# Patient Record
Sex: MALE | Race: WHITE | NOT HISPANIC OR LATINO | Employment: FULL TIME | URBAN - METROPOLITAN AREA
[De-identification: names, ages, dates, MRNs, and addresses within clinical notes are randomized per-mention and may not be internally consistent; named-entity substitution may affect disease eponyms.]

---

## 2024-08-21 ENCOUNTER — HOSPITAL ENCOUNTER (OUTPATIENT)
Dept: RADIOLOGY | Facility: CLINIC | Age: 34
Discharge: HOME | End: 2024-08-21
Payer: COMMERCIAL

## 2024-08-21 DIAGNOSIS — M79.671 RIGHT FOOT PAIN: ICD-10-CM

## 2024-08-21 PROCEDURE — 73630 X-RAY EXAM OF FOOT: CPT | Mod: RT

## 2024-08-22 ENCOUNTER — OFFICE VISIT (OUTPATIENT)
Dept: ORTHOPEDIC SURGERY | Facility: CLINIC | Age: 34
End: 2024-08-22
Payer: COMMERCIAL

## 2024-08-22 VITALS — WEIGHT: 185 LBS | HEIGHT: 70 IN | BODY MASS INDEX: 26.48 KG/M2

## 2024-08-22 DIAGNOSIS — S93.601A RIGHT FOOT SPRAIN, INITIAL ENCOUNTER: Primary | ICD-10-CM

## 2024-08-22 PROCEDURE — 3008F BODY MASS INDEX DOCD: CPT

## 2024-08-22 PROCEDURE — 1036F TOBACCO NON-USER: CPT

## 2024-08-22 PROCEDURE — 99203 OFFICE O/P NEW LOW 30 MIN: CPT

## 2024-08-22 ASSESSMENT — PAIN SCALES - GENERAL: PAINLEVEL_OUTOF10: 4

## 2024-08-22 ASSESSMENT — PAIN - FUNCTIONAL ASSESSMENT: PAIN_FUNCTIONAL_ASSESSMENT: 0-10

## 2024-08-22 NOTE — PROGRESS NOTES
HPI  Mercy Gonzalez is a 34 y.o. male  in office today for   Chief Complaint   Patient presents with    Right Foot - Pain, Edema     Patient was weight lifting jumped on the weight and rolled his foot 8/20/24   .  he states that the pain the last 2 days has been 8/10, today 4/10, so it is improving.  Pain is medial side of foot at base of big toe, worse with eversion of foot.  He went to urgent care, placed into boot.  States it hurts to walk out of the boot.  He has been taking ibuprofen which does help with pain, icing and elevating.      Medication  No current outpatient medications on file prior to visit.     No current facility-administered medications on file prior to visit.       Physical Exam  Constitutional: well developed, well nourished male in no acute distress  Psychiatric: normal mood, appropriate affect  Eyes: sclera anicteric  HENT: normocephalic/atraumatic  CV: regular rate and rhythm   Respiratory: non labored breathing  Integumentary: no rash  Neurological: moves all extremities    Right Ankle Exam     Tenderness   Right ankle tenderness location: base of big toe near the MTP joint.  No tenderness base of first metatarsal or near Lisfranc ligament.  Swelling: mild    Other   Erythema: absent  Scars: absent  Sensation: normal     Comments:  Decreased ROM big toe secondary to edema, no ecchymosis              Imaging/Lab:  X-rays were taken yesterday which were reviewed by myself and read by radiology and show Soft tissue swelling along the medial aspect of the forefoot.  The alignment is anatomic.  3.4 mm linear calcification along the medial aspect of the base first metatarsal in the region of the Lisfranc      Assessment  Assessment: right foot sprain    Plan  Plan:  History, physical exam, and imaging were reviewed with patient. He is not having pain in the foot in area where concern for calcification was seen on imaging, so holding on additional imaging at this time. Discussed remaining in  the boot until he is pain free.  As the pain improves in the boot, can slowly transition out of the boot and into good supportive shoes.  Continue with RICE and antiinflammatories for pain and swelling.  Discussed Tylenol for pain in between NSAID doses if needed.  Follow Up: 1-2 weeks if pain persists or gets worse.    All questions were answered for the patient prior to end of exam and patient addressed their understanding.    Alejandrina Sanabria PA-C  08/22/24